# Patient Record
Sex: MALE | Race: BLACK OR AFRICAN AMERICAN | NOT HISPANIC OR LATINO | Employment: FULL TIME | URBAN - METROPOLITAN AREA
[De-identification: names, ages, dates, MRNs, and addresses within clinical notes are randomized per-mention and may not be internally consistent; named-entity substitution may affect disease eponyms.]

---

## 2021-09-18 ENCOUNTER — HOSPITAL ENCOUNTER (EMERGENCY)
Facility: HOSPITAL | Age: 41
Discharge: HOME | End: 2021-09-19
Attending: EMERGENCY MEDICINE | Admitting: EMERGENCY MEDICINE

## 2021-09-18 ENCOUNTER — APPOINTMENT (OUTPATIENT)
Dept: GENERAL RADIOLOGY | Facility: HOSPITAL | Age: 41
End: 2021-09-18

## 2021-09-18 VITALS — HEART RATE: 99 BPM | TEMPERATURE: 97.6 F | OXYGEN SATURATION: 95 % | RESPIRATION RATE: 18 BRPM

## 2021-09-18 DIAGNOSIS — S42.491A OTHER CLOSED DISPLACED FRACTURE OF DISTAL END OF RIGHT HUMERUS, INITIAL ENCOUNTER: Primary | ICD-10-CM

## 2021-09-18 PROCEDURE — 99283 EMERGENCY DEPT VISIT LOW MDM: CPT

## 2021-09-18 PROCEDURE — 73090 X-RAY EXAM OF FOREARM: CPT

## 2021-09-18 PROCEDURE — 73060 X-RAY EXAM OF HUMERUS: CPT

## 2021-09-18 RX ORDER — HYDROCODONE BITARTRATE AND ACETAMINOPHEN 5; 325 MG/1; MG/1
2 TABLET ORAL ONCE
Status: COMPLETED | OUTPATIENT
Start: 2021-09-18 | End: 2021-09-18

## 2021-09-18 RX ADMIN — HYDROCODONE BITARTRATE AND ACETAMINOPHEN 2 TABLET: 5; 325 TABLET ORAL at 22:49

## 2021-09-19 RX ORDER — HYDROCODONE BITARTRATE AND ACETAMINOPHEN 5; 325 MG/1; MG/1
1 TABLET ORAL EVERY 6 HOURS PRN
Qty: 10 TABLET | Refills: 0 | Status: SHIPPED | OUTPATIENT
Start: 2021-09-19 | End: 2021-09-21 | Stop reason: HOSPADM

## 2021-09-19 NOTE — ED PROVIDER NOTES
EMERGENCY DEPARTMENT ENCOUNTER    Room Number:  07/07  Date of encounter:  9/19/2021  PCP: Provider, No Known  Historian: Patient      HPI:  Chief Complaint: right arm injury       Context: Conor Abel is a 40 y.o. male who presents to the ED c/o right arm injury.  Patient states that he was at a arm wrestling competition at the [employer] festival when he heard a pop while arm wrestling.  Now with soft tissue swelling, pain, and decreased range of motion due to pain.      PAST MEDICAL HISTORY  Active Ambulatory Problems     Diagnosis Date Noted   • No Active Ambulatory Problems     Resolved Ambulatory Problems     Diagnosis Date Noted   • No Resolved Ambulatory Problems     No Additional Past Medical History         PAST SURGICAL HISTORY  No past surgical history on file.      FAMILY HISTORY  No family history on file.      SOCIAL HISTORY  Social History     Socioeconomic History   • Marital status: Single     Spouse name: Not on file   • Number of children: Not on file   • Years of education: Not on file   • Highest education level: Not on file         ALLERGIES  Patient has no known allergies.        REVIEW OF SYSTEMS  Review of Systems     All systems reviewed and negative except for those discussed in HPI.       PHYSICAL EXAM    I have reviewed the triage vital signs and nursing notes.    ED Triage Vitals [09/18/21 2144]   Temp Heart Rate Resp BP SpO2   97.6 °F (36.4 °C) 99 18 -- 95 %      Temp src Heart Rate Source Patient Position BP Location FiO2 (%)   Tympanic Monitor -- -- --       Physical Exam  GENERAL: Alert and oriented x3, moderate pain   HENT: nares patent  EYES: no scleral icterus, PERRL, EOMI  CV: regular rhythm, regular rate, intact radial pulse  RESPIRATORY: normal effort  MUSCULOSKELETAL: no deformity, moderate soft tissue swelling to the distal right upper arm with moderate tenderness, normal range of motion at the elbow and wrist, neurovascular intact distally  NEURO: alert, moves all  extremities, follows commands  SKIN: warm, dry, intact        LAB RESULTS  No results found for this or any previous visit (from the past 24 hour(s)).    Ordered the above labs and independently reviewed the results.        RADIOLOGY  XR Forearm 2 View Right, XR Humerus Right    Result Date: 9/18/2021  2 VIEWS RIGHT FOREARM; 2 VIEWS RIGHT HUMERUS  HISTORY: Pain  COMPARISON: None available.  FINDINGS: Right forearm: No acute fracture or subluxation of the right forearm is seen. There is no elbow effusion.  Right humerus: There is an obliquely oriented fracture of the distal humeral diaphysis. No fracture of the proximal humerus is seen. Shoulder joint appears intact. There is associated soft tissue swelling.      Obliquely oriented fracture of the distal humeral diaphysis.  This report was finalized on 9/18/2021 11:38 PM by Dr. Nemo Bruner M.D.        I ordered the above noted radiological studies. Reviewed by me and discussed with radiologist.  See dictation for official radiology interpretation.      PROCEDURES    Procedures      MEDICATIONS GIVEN IN ER    Medications   HYDROcodone-acetaminophen (NORCO) 5-325 MG per tablet 2 tablet (2 tablets Oral Given 9/18/21 2249)         PROGRESS, DATA ANALYSIS, CONSULTS, AND MEDICAL DECISION MAKING    All labs have been independently reviewed by me.  All radiology studies have been reviewed by me and discussed with radiologist dictating the report.   EKG's independently viewed and interpreted by me.  Discussion below represents my analysis of pertinent findings related to patient's condition, differential diagnosis, treatment plan and final disposition.    DDx: Includes but elevated to humerus fracture, elbow fracture, elbow dislocation, biceps tendon rupture, forearm fracture    ED Course as of Sep 19 0157   Sat Sep 18, 2021   2344 Patient care discussed with Dr. Castro, requested splint, sling and will follow up in the office.    [LINK]   Isabel Sep 19, 2021   0108 Patient  rechecked, pain improved, discussed treatment plan and need for follow-up with orthopedics with the patient.  Also discussed strict return to ER precautions including signs and symptoms of possible compartment syndrome, numbness, tingling, weakness, or any other signs or symptoms of worsening condition.  Patient expressed understanding and agrees with plan.    [LINK]   0108 Splint Application: Right arm  Splint Type: Posterior long-arm  Indication: Distal humerus fracture  Splint placed by WIL  Post splint application:   1) neurovascularly intact   2) good position  Discussed splint care with patient  Discussed PMD/orthopedic follow up      [LINK]      ED Course User Index  [LINK] Law Duenas, PA       MDM: Patient's x-rays reveal a mildly displaced distal radius fracture, have consulted with orthopedist and placed patient in a splint and sling and given follow-up information.  Patient has been instructed to wear splint apply ice and elevate and to watch for signs and symptoms of possible compartment syndrome.  Strict return to ER precautions given to the patient.    PPE: The patient wore a surgical mask throughout the entire patient encounter. I wore an N95.    AS OF 01:57 EDT VITALS:    BP -    HR - 99  TEMP - 97.6 °F (36.4 °C) (Tympanic)  O2 SATS - 95%        DIAGNOSIS  Final diagnoses:   Other closed displaced fracture of distal end of right humerus, initial encounter         DISPOSITION  DISCHARGE    Patient discharged in stable condition.    Reviewed implications of results, diagnosis, meds, responsibility to follow up, warning signs and symptoms of possible worsening, potential complications and reasons to return to ER.    Patient/Family voiced understanding of above instructions.    Discussed plan for discharge, as there is no emergent indication for admission. Patient referred to primary care provider for BP management due to today's BP. Pt/family is agreeable and understands need for follow up and repeat  testing.  Pt is aware that discharge does not mean that nothing is wrong but it indicates no emergency is present that requires admission and they must continue care with follow-up as given below or physician of their choice.     FOLLOW-UP  Myles Leblanc MD  [address] [facility] [address] [phone]    Schedule an appointment as soon as possible for a visit in 1 day           Medication List      New Prescriptions    HYDROcodone-acetaminophen 5-325 MG per tablet  Commonly known as: NORCO  Take 1 tablet by mouth Every 6 (Six) Hours As Needed for Moderate Pain  for up to 3 days.           Where to Get Your Medications      These medications were sent to [id number] - [place], [place] - [address] AT [facility] - [phone] [facility] [phone] [address] [address]    Hours: 24-hours Phone: [phone]   · HYDROcodone-acetaminophen 5-325 MG per tablet                    Law Duenas PA  09/19/21 0157

## 2021-09-19 NOTE — ED PROVIDER NOTES
Subjective   40-year-old healthy male here for chief complaint of right arm pain.  History was obtained from the patient.  The patient states that he was at a festival today arm wrestling when he heard a snap and has pain in his right humerus.  Patient denies any pain in his right elbow, forearm, wrist, hands, shoulder.  He denies any other injuries.  He states that the pain is sharp, worse with movement, improves with rest, moderate, does not radiate. Pt denies any injuries to his head.  He denies being on any blood thinners.  Patient states that he was drinking a few beers today and had 1 Deana.  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  He denies doing any drugs.          Review of Systems   All other systems reviewed and are negative.      No past medical history on file.    No Known Allergies    No past surgical history on file.    No family history on file.    Social History     Socioeconomic History   • Marital status: Single     Spouse name: Not on file   • Number of children: Not on file   • Years of education: Not on file   • Highest education level: Not on file           Objective   Physical Exam  Constitutional:       General: He is not in acute distress.     Appearance: He is not ill-appearing, toxic-appearing or diaphoretic.   HENT:      Head: Normocephalic and atraumatic.      Right Ear: External ear normal.      Left Ear: External ear normal.      Nose: Nose normal. No congestion or rhinorrhea.      Mouth/Throat:      Mouth: Mucous membranes are moist.      Pharynx: Oropharynx is clear. No oropharyngeal exudate or posterior oropharyngeal erythema.   Eyes:      General: No scleral icterus.        Right eye: No discharge.         Left eye: No discharge.      Extraocular Movements: Extraocular movements intact.      Conjunctiva/sclera: Conjunctivae normal.      Pupils: Pupils are equal, round, and reactive to light.   Neck:      Comments: No midline cervical tenderness  noted  Cardiovascular:      Rate and Rhythm: Normal rate and regular rhythm.      Pulses: Normal pulses.      Heart sounds: Normal heart sounds. No murmur heard.   No friction rub. No gallop.    Pulmonary:      Effort: Pulmonary effort is normal. No respiratory distress.      Breath sounds: Normal breath sounds. No stridor. No wheezing, rhonchi or rales.   Chest:      Chest wall: No tenderness.   Abdominal:      General: Abdomen is flat. Bowel sounds are normal. There is no distension.      Tenderness: There is no abdominal tenderness. There is no right CVA tenderness, left CVA tenderness, guarding or rebound.   Musculoskeletal:         General: Tenderness (Right humerus) present. No swelling.      Cervical back: Normal range of motion and neck supple. No rigidity or tenderness.      Right lower leg: No edema.      Left lower leg: No edema.      Comments: Right upper extremity: 2+ radial pulses, soft compartments, normal sensorimotor exam in ulnar, median, radial distribution, normal sensory exam in axillary distribution, no scaphoid tenderness, no forearm tenderness, no elbow tenderness, no tenderness in the hand, no tenderness at the shoulder    No injuries noted to any other extremities, no tenderness to palpation of the thoracic or lumbar spine.   Skin:     General: Skin is warm and dry.      Capillary Refill: Capillary refill takes less than 2 seconds.      Coloration: Skin is not jaundiced or pale.   Neurological:      Mental Status: He is alert and oriented to person, place, and time.      Sensory: No sensory deficit.      Motor: No weakness.   Psychiatric:         Mood and Affect: Mood normal.         Behavior: Behavior normal.         Procedures           ED Course  ED Course as of Sep 19 0249   Sat Sep 18, 2021   6808 Patient care discussed with Dr. Castro, requested splint, sling and will follow up in the office.    [LINK]   Isabel Sep 19, 2021   0108 Patient rechecked, pain improved, discussed treatment plan  and need for follow-up with orthopedics with the patient.  Also discussed strict return to ER precautions including signs and symptoms of possible compartment syndrome, numbness, tingling, weakness, or any other signs or symptoms of worsening condition.  Patient expressed understanding and agrees with plan.    [LINK]   0108 Splint Application: Right arm  Splint Type: Posterior long-arm  Indication: Distal humerus fracture  Splint placed by WIL  Post splint application:   1) neurovascularly intact   2) good position  Discussed splint care with patient  Discussed PMD/orthopedic follow up      [LINK]      ED Course User Index  [LINK] Law Duenas PA                                           MDM  Number of Diagnoses or Management Options     Amount and/or Complexity of Data Reviewed  Tests in the radiology section of CPT®: reviewed    Risk of Complications, Morbidity, and/or Mortality  General comments: When I first saw the patient, the patient appeared nontoxic.  Patient was complaining of right humerus pain.  Given this, x-rays were obtained.  This showed that the patient had a right humerus fracture.  Given this, we consulted orthopedic surgery.  Dr. Castro called us back.  He wanted the arm splinted.  He was comfortable with the patient going home.  He was in a follow the patient up in the outpatient clinic.  The patient was told to follow-up with him in 2 to 3 days.  Patient did get a splint placed.  Please refer to the procedure note for further details.  The patient tolerated procedure well.  Patient was neurovascular intact in the right upper extremity.  Patient's compartments are soft.  Patient was given a prescription for pain meds that was sent directly to the pharmacy.  Patient was given strict return precautions including any numbness in the arm, tingling in the arm, weakness, changes in skin color, increasing pain, or any other concerning symptoms.  All questions were answered and patient was discharged in  stable condition.  Patient expressed understanding was glad to be discharged home.    Patient Progress  Patient progress: improved      Final diagnoses:   Other closed displaced fracture of distal end of right humerus, initial encounter       ED Disposition  ED Disposition     ED Disposition Condition Comment    Discharge Stable           Myles Leblanc MD  [address] [facility] [address] [phone]    Schedule an appointment as soon as possible for a visit in 1 day           Medication List      New Prescriptions    HYDROcodone-acetaminophen 5-325 MG per tablet  Commonly known as: NORCO  Take 1 tablet by mouth Every 6 (Six) Hours As Needed for Moderate Pain  for up to 3 days.           Where to Get Your Medications      These medications were sent to [id number] - [place], [place] - [address] [facility] AT [facility] [phone] [facility] [phone] [address] [address]    Hours: 24-hours Phone: [phone]   · HYDROcodone-acetaminophen 5-325 MG per tablet          Yazan Pyle MD  09/19/21 4238

## 2021-09-19 NOTE — ED NOTES
Assisted provider with splinting of right arm. Sling applied, good pulses and circulation     Margo Andrew RN  09/19/21 0117

## 2021-09-19 NOTE — ED TRIAGE NOTES
Patient to ED with friend who states they were at the [employer] festival and arm wrestling a friend when he heard a pop.

## 2021-09-19 NOTE — DISCHARGE INSTRUCTIONS
Wear splint, apply ice and elevate, pain medicine as needed, call first thing Monday morning to schedule follow up with the orthopedist.  Return to care if you develop worsening pain, numbness, tingling, or weakness of the arm.

## 2021-09-20 ENCOUNTER — LAB (OUTPATIENT)
Dept: LAB | Facility: HOSPITAL | Age: 41
End: 2021-09-20

## 2021-09-20 ENCOUNTER — HOSPITAL ENCOUNTER (OUTPATIENT)
Dept: CARDIOLOGY | Facility: HOSPITAL | Age: 41
Discharge: HOME | End: 2021-09-20

## 2021-09-20 ENCOUNTER — TRANSCRIBE ORDERS (OUTPATIENT)
Dept: CARDIOLOGY | Facility: HOSPITAL | Age: 41
End: 2021-09-20

## 2021-09-20 ENCOUNTER — TRANSCRIBE ORDERS (OUTPATIENT)
Dept: ADMINISTRATIVE | Facility: HOSPITAL | Age: 41
End: 2021-09-20

## 2021-09-20 ENCOUNTER — ANESTHESIA EVENT (OUTPATIENT)
Dept: PERIOP | Facility: HOSPITAL | Age: 41
End: 2021-09-20

## 2021-09-20 ENCOUNTER — HOSPITAL ENCOUNTER (OUTPATIENT)
Dept: GENERAL RADIOLOGY | Facility: HOSPITAL | Age: 41
Discharge: HOME | End: 2021-09-20

## 2021-09-20 DIAGNOSIS — S42.411B RIGHT SUPRACONDYLAR HUMERUS FRACTURE, OPEN, INITIAL ENCOUNTER: Primary | ICD-10-CM

## 2021-09-20 DIAGNOSIS — S42.411B RIGHT SUPRACONDYLAR HUMERUS FRACTURE, OPEN, INITIAL ENCOUNTER: ICD-10-CM

## 2021-09-20 DIAGNOSIS — Z01.818 PREOP TESTING: ICD-10-CM

## 2021-09-20 DIAGNOSIS — Z01.811 PRE-OP CHEST EXAM: Primary | ICD-10-CM

## 2021-09-20 LAB
ALBUMIN SERPL-MCNC: 4.4 G/DL (ref 3.5–5.2)
ALBUMIN/GLOB SERPL: 1.4 G/DL
ALP SERPL-CCNC: 48 U/L (ref 39–117)
ALT SERPL W P-5'-P-CCNC: 22 U/L (ref 1–41)
ANION GAP SERPL CALCULATED.3IONS-SCNC: 12.4 MMOL/L (ref 5–15)
AST SERPL-CCNC: 28 U/L (ref 1–40)
BILIRUB SERPL-MCNC: 0.4 MG/DL (ref 0–1.2)
BUN SERPL-MCNC: 10 MG/DL (ref 6–20)
BUN/CREAT SERPL: 8.3 (ref 7–25)
CALCIUM SPEC-SCNC: 9.3 MG/DL (ref 8.6–10.5)
CHLORIDE SERPL-SCNC: 102 MMOL/L (ref 98–107)
CO2 SERPL-SCNC: 24.6 MMOL/L (ref 22–29)
CREAT SERPL-MCNC: 1.21 MG/DL (ref 0.76–1.27)
DEPRECATED RDW RBC AUTO: 45.5 FL (ref 37–54)
ERYTHROCYTE [DISTWIDTH] IN BLOOD BY AUTOMATED COUNT: 13.8 % (ref 12.3–15.4)
GFR SERPL CREATININE-BSD FRML MDRD: 80 ML/MIN/1.73
GLOBULIN UR ELPH-MCNC: 3.1 GM/DL
GLUCOSE SERPL-MCNC: 89 MG/DL (ref 65–99)
HCT VFR BLD AUTO: 47.4 % (ref 37.5–51)
HGB BLD-MCNC: 15.7 G/DL (ref 13–17.7)
MCH RBC QN AUTO: 29.8 PG (ref 26.6–33)
MCHC RBC AUTO-ENTMCNC: 33.1 G/DL (ref 31.5–35.7)
MCV RBC AUTO: 89.9 FL (ref 79–97)
PLATELET # BLD AUTO: 285 10*3/MM3 (ref 140–450)
PMV BLD AUTO: 9.8 FL (ref 6–12)
POTASSIUM SERPL-SCNC: 4 MMOL/L (ref 3.5–5.2)
PROT SERPL-MCNC: 7.5 G/DL (ref 6–8.5)
QT INTERVAL: 367 MS
RBC # BLD AUTO: 5.27 10*6/MM3 (ref 4.14–5.8)
SARS-COV-2 ORF1AB RESP QL NAA+PROBE: NOT DETECTED
SODIUM SERPL-SCNC: 139 MMOL/L (ref 136–145)
WBC # BLD AUTO: 10.8 10*3/MM3 (ref 3.4–10.8)

## 2021-09-20 PROCEDURE — 80053 COMPREHEN METABOLIC PANEL: CPT

## 2021-09-20 PROCEDURE — 93010 ELECTROCARDIOGRAM REPORT: CPT | Performed by: INTERNAL MEDICINE

## 2021-09-20 PROCEDURE — C9803 HOPD COVID-19 SPEC COLLECT: HCPCS | Performed by: ORTHOPAEDIC SURGERY

## 2021-09-20 PROCEDURE — U0004 COV-19 TEST NON-CDC HGH THRU: HCPCS | Performed by: ORTHOPAEDIC SURGERY

## 2021-09-20 PROCEDURE — 85027 COMPLETE CBC AUTOMATED: CPT

## 2021-09-20 PROCEDURE — 93005 ELECTROCARDIOGRAM TRACING: CPT

## 2021-09-20 PROCEDURE — 71046 X-RAY EXAM CHEST 2 VIEWS: CPT

## 2021-09-20 PROCEDURE — 36415 COLL VENOUS BLD VENIPUNCTURE: CPT

## 2021-09-20 RX ORDER — OMEPRAZOLE 20 MG/1
20 CAPSULE, DELAYED RELEASE ORAL DAILY PRN
COMMUNITY

## 2021-09-21 ENCOUNTER — APPOINTMENT (OUTPATIENT)
Dept: GENERAL RADIOLOGY | Facility: HOSPITAL | Age: 41
End: 2021-09-21

## 2021-09-21 ENCOUNTER — HOSPITAL ENCOUNTER (OUTPATIENT)
Facility: HOSPITAL | Age: 41
Setting detail: HOSPITAL OUTPATIENT SURGERY
Discharge: HOME | End: 2021-09-21
Attending: ORTHOPAEDIC SURGERY | Admitting: ORTHOPAEDIC SURGERY

## 2021-09-21 ENCOUNTER — ANESTHESIA (OUTPATIENT)
Dept: PERIOP | Facility: HOSPITAL | Age: 41
End: 2021-09-21

## 2021-09-21 VITALS
WEIGHT: 193.9 LBS | TEMPERATURE: 97.6 F | BODY MASS INDEX: 26.26 KG/M2 | OXYGEN SATURATION: 96 % | HEART RATE: 85 BPM | RESPIRATION RATE: 18 BRPM | DIASTOLIC BLOOD PRESSURE: 92 MMHG | SYSTOLIC BLOOD PRESSURE: 134 MMHG | HEIGHT: 72 IN

## 2021-09-21 DIAGNOSIS — S42.491A OTHER CLOSED DISPLACED FRACTURE OF DISTAL END OF RIGHT HUMERUS, INITIAL ENCOUNTER: Primary | ICD-10-CM

## 2021-09-21 PROCEDURE — 25010000002 HYDROMORPHONE PER 4 MG: Performed by: NURSE ANESTHETIST, CERTIFIED REGISTERED

## 2021-09-21 PROCEDURE — 25010000003 BUPIVACAINE LIPOSOME 1.3 % SUSPENSION: Performed by: ANESTHESIOLOGY

## 2021-09-21 PROCEDURE — 76942 ECHO GUIDE FOR BIOPSY: CPT | Performed by: ORTHOPAEDIC SURGERY

## 2021-09-21 PROCEDURE — C1713 ANCHOR/SCREW BN/BN,TIS/BN: HCPCS | Performed by: ORTHOPAEDIC SURGERY

## 2021-09-21 PROCEDURE — 25010000002 NEOSTIGMINE 5 MG/10ML SOLUTION: Performed by: NURSE ANESTHETIST, CERTIFIED REGISTERED

## 2021-09-21 PROCEDURE — C9290 INJ, BUPIVACAINE LIPOSOME: HCPCS | Performed by: ANESTHESIOLOGY

## 2021-09-21 PROCEDURE — 25010000002 MIDAZOLAM PER 1 MG: Performed by: ANESTHESIOLOGY

## 2021-09-21 PROCEDURE — 25010000002 ONDANSETRON PER 1 MG: Performed by: NURSE ANESTHETIST, CERTIFIED REGISTERED

## 2021-09-21 PROCEDURE — 25010000002 PROPOFOL 10 MG/ML EMULSION: Performed by: NURSE ANESTHETIST, CERTIFIED REGISTERED

## 2021-09-21 PROCEDURE — 73060 X-RAY EXAM OF HUMERUS: CPT

## 2021-09-21 PROCEDURE — 76000 FLUOROSCOPY <1 HR PHYS/QHP: CPT

## 2021-09-21 PROCEDURE — C1889 IMPLANT/INSERT DEVICE, NOC: HCPCS | Performed by: ORTHOPAEDIC SURGERY

## 2021-09-21 PROCEDURE — 25010000002 DEXAMETHASONE PER 1 MG: Performed by: NURSE ANESTHETIST, CERTIFIED REGISTERED

## 2021-09-21 PROCEDURE — 25010000003 CEFAZOLIN IN DEXTROSE 2-4 GM/100ML-% SOLUTION: Performed by: ORTHOPAEDIC SURGERY

## 2021-09-21 DEVICE — IMPLANTABLE DEVICE: Type: IMPLANTABLE DEVICE | Site: HUMERUS | Status: FUNCTIONAL

## 2021-09-21 DEVICE — SUT FW #2 W/TPR NDL 1/2 CIR 38IN 97CM 26.5MM BLU: Type: IMPLANTABLE DEVICE | Site: HUMERUS | Status: FUNCTIONAL

## 2021-09-21 RX ORDER — EPHEDRINE SULFATE 50 MG/ML
5 INJECTION, SOLUTION INTRAVENOUS ONCE AS NEEDED
Status: DISCONTINUED | OUTPATIENT
Start: 2021-09-21 | End: 2021-09-21 | Stop reason: HOSPADM

## 2021-09-21 RX ORDER — LIDOCAINE HYDROCHLORIDE 10 MG/ML
0.5 INJECTION, SOLUTION EPIDURAL; INFILTRATION; INTRACAUDAL; PERINEURAL ONCE AS NEEDED
Status: DISCONTINUED | OUTPATIENT
Start: 2021-09-21 | End: 2021-09-21 | Stop reason: HOSPADM

## 2021-09-21 RX ORDER — FENTANYL CITRATE 50 UG/ML
50 INJECTION, SOLUTION INTRAMUSCULAR; INTRAVENOUS
Status: DISCONTINUED | OUTPATIENT
Start: 2021-09-21 | End: 2021-09-21 | Stop reason: SDUPTHER

## 2021-09-21 RX ORDER — CEFAZOLIN SODIUM 2 G/100ML
2 INJECTION, SOLUTION INTRAVENOUS ONCE
Status: COMPLETED | OUTPATIENT
Start: 2021-09-21 | End: 2021-09-21

## 2021-09-21 RX ORDER — FLUMAZENIL 0.1 MG/ML
0.2 INJECTION INTRAVENOUS AS NEEDED
Status: DISCONTINUED | OUTPATIENT
Start: 2021-09-21 | End: 2021-09-21 | Stop reason: HOSPADM

## 2021-09-21 RX ORDER — HYDRALAZINE HYDROCHLORIDE 20 MG/ML
5 INJECTION INTRAMUSCULAR; INTRAVENOUS
Status: DISCONTINUED | OUTPATIENT
Start: 2021-09-21 | End: 2021-09-21 | Stop reason: HOSPADM

## 2021-09-21 RX ORDER — HYDROMORPHONE HCL 110MG/55ML
PATIENT CONTROLLED ANALGESIA SYRINGE INTRAVENOUS AS NEEDED
Status: DISCONTINUED | OUTPATIENT
Start: 2021-09-21 | End: 2021-09-21 | Stop reason: SURG

## 2021-09-21 RX ORDER — ROCURONIUM BROMIDE 10 MG/ML
INJECTION, SOLUTION INTRAVENOUS AS NEEDED
Status: DISCONTINUED | OUTPATIENT
Start: 2021-09-21 | End: 2021-09-21 | Stop reason: SURG

## 2021-09-21 RX ORDER — ONDANSETRON 2 MG/ML
INJECTION INTRAMUSCULAR; INTRAVENOUS AS NEEDED
Status: DISCONTINUED | OUTPATIENT
Start: 2021-09-21 | End: 2021-09-21 | Stop reason: SURG

## 2021-09-21 RX ORDER — SODIUM CHLORIDE 0.9 % (FLUSH) 0.9 %
3 SYRINGE (ML) INJECTION EVERY 12 HOURS SCHEDULED
Status: DISCONTINUED | OUTPATIENT
Start: 2021-09-21 | End: 2021-09-21 | Stop reason: HOSPADM

## 2021-09-21 RX ORDER — SODIUM CHLORIDE, SODIUM LACTATE, POTASSIUM CHLORIDE, CALCIUM CHLORIDE 600; 310; 30; 20 MG/100ML; MG/100ML; MG/100ML; MG/100ML
9 INJECTION, SOLUTION INTRAVENOUS CONTINUOUS
Status: DISCONTINUED | OUTPATIENT
Start: 2021-09-21 | End: 2021-09-21 | Stop reason: HOSPADM

## 2021-09-21 RX ORDER — DEXAMETHASONE SODIUM PHOSPHATE 10 MG/ML
INJECTION INTRAMUSCULAR; INTRAVENOUS AS NEEDED
Status: DISCONTINUED | OUTPATIENT
Start: 2021-09-21 | End: 2021-09-21 | Stop reason: SURG

## 2021-09-21 RX ORDER — PROMETHAZINE HYDROCHLORIDE 25 MG/1
25 TABLET ORAL ONCE AS NEEDED
Status: DISCONTINUED | OUTPATIENT
Start: 2021-09-21 | End: 2021-09-21 | Stop reason: HOSPADM

## 2021-09-21 RX ORDER — IBUPROFEN 600 MG/1
600 TABLET ORAL EVERY 6 HOURS PRN
Qty: 40 TABLET | Refills: 0 | Status: SHIPPED | OUTPATIENT
Start: 2021-09-21

## 2021-09-21 RX ORDER — HYDROCODONE BITARTRATE AND ACETAMINOPHEN 7.5; 325 MG/1; MG/1
1 TABLET ORAL ONCE AS NEEDED
Status: DISCONTINUED | OUTPATIENT
Start: 2021-09-21 | End: 2021-09-21 | Stop reason: HOSPADM

## 2021-09-21 RX ORDER — FAMOTIDINE 10 MG/ML
20 INJECTION, SOLUTION INTRAVENOUS ONCE
Status: DISCONTINUED | OUTPATIENT
Start: 2021-09-21 | End: 2021-09-21 | Stop reason: HOSPADM

## 2021-09-21 RX ORDER — LIDOCAINE HYDROCHLORIDE 20 MG/ML
INJECTION, SOLUTION INFILTRATION; PERINEURAL AS NEEDED
Status: DISCONTINUED | OUTPATIENT
Start: 2021-09-21 | End: 2021-09-21 | Stop reason: SURG

## 2021-09-21 RX ORDER — NEOSTIGMINE METHYLSULFATE 0.5 MG/ML
INJECTION, SOLUTION INTRAVENOUS AS NEEDED
Status: DISCONTINUED | OUTPATIENT
Start: 2021-09-21 | End: 2021-09-21 | Stop reason: SURG

## 2021-09-21 RX ORDER — FAMOTIDINE 10 MG/ML
20 INJECTION, SOLUTION INTRAVENOUS ONCE
Status: COMPLETED | OUTPATIENT
Start: 2021-09-21 | End: 2021-09-21

## 2021-09-21 RX ORDER — ONDANSETRON 2 MG/ML
4 INJECTION INTRAMUSCULAR; INTRAVENOUS ONCE AS NEEDED
Status: DISCONTINUED | OUTPATIENT
Start: 2021-09-21 | End: 2021-09-21 | Stop reason: HOSPADM

## 2021-09-21 RX ORDER — LABETALOL HYDROCHLORIDE 5 MG/ML
5 INJECTION, SOLUTION INTRAVENOUS
Status: DISCONTINUED | OUTPATIENT
Start: 2021-09-21 | End: 2021-09-21 | Stop reason: HOSPADM

## 2021-09-21 RX ORDER — SODIUM CHLORIDE 0.9 % (FLUSH) 0.9 %
3-10 SYRINGE (ML) INJECTION AS NEEDED
Status: DISCONTINUED | OUTPATIENT
Start: 2021-09-21 | End: 2021-09-21 | Stop reason: HOSPADM

## 2021-09-21 RX ORDER — DIPHENHYDRAMINE HYDROCHLORIDE 50 MG/ML
12.5 INJECTION INTRAMUSCULAR; INTRAVENOUS
Status: DISCONTINUED | OUTPATIENT
Start: 2021-09-21 | End: 2021-09-21 | Stop reason: HOSPADM

## 2021-09-21 RX ORDER — MIDAZOLAM HYDROCHLORIDE 1 MG/ML
1 INJECTION INTRAMUSCULAR; INTRAVENOUS
Status: DISCONTINUED | OUTPATIENT
Start: 2021-09-21 | End: 2021-09-21 | Stop reason: SDUPTHER

## 2021-09-21 RX ORDER — FENTANYL CITRATE 50 UG/ML
50 INJECTION, SOLUTION INTRAMUSCULAR; INTRAVENOUS
Status: DISCONTINUED | OUTPATIENT
Start: 2021-09-21 | End: 2021-09-21 | Stop reason: HOSPADM

## 2021-09-21 RX ORDER — NALOXONE HCL 0.4 MG/ML
0.2 VIAL (ML) INJECTION AS NEEDED
Status: DISCONTINUED | OUTPATIENT
Start: 2021-09-21 | End: 2021-09-21 | Stop reason: HOSPADM

## 2021-09-21 RX ORDER — MIDAZOLAM HYDROCHLORIDE 1 MG/ML
1 INJECTION INTRAMUSCULAR; INTRAVENOUS
Status: DISCONTINUED | OUTPATIENT
Start: 2021-09-21 | End: 2021-09-21 | Stop reason: HOSPADM

## 2021-09-21 RX ORDER — OXYCODONE AND ACETAMINOPHEN 7.5; 325 MG/1; MG/1
1-2 TABLET ORAL EVERY 6 HOURS PRN
Qty: 40 TABLET | Refills: 0 | Status: SHIPPED | OUTPATIENT
Start: 2021-09-21

## 2021-09-21 RX ORDER — PROMETHAZINE HYDROCHLORIDE 25 MG/1
25 SUPPOSITORY RECTAL ONCE AS NEEDED
Status: DISCONTINUED | OUTPATIENT
Start: 2021-09-21 | End: 2021-09-21 | Stop reason: HOSPADM

## 2021-09-21 RX ORDER — LIDOCAINE HYDROCHLORIDE 40 MG/ML
SOLUTION TOPICAL AS NEEDED
Status: DISCONTINUED | OUTPATIENT
Start: 2021-09-21 | End: 2021-09-21 | Stop reason: SURG

## 2021-09-21 RX ORDER — MAGNESIUM HYDROXIDE 1200 MG/15ML
LIQUID ORAL AS NEEDED
Status: DISCONTINUED | OUTPATIENT
Start: 2021-09-21 | End: 2021-09-21 | Stop reason: HOSPADM

## 2021-09-21 RX ORDER — IBUPROFEN 600 MG/1
600 TABLET ORAL ONCE AS NEEDED
Status: DISCONTINUED | OUTPATIENT
Start: 2021-09-21 | End: 2021-09-21 | Stop reason: HOSPADM

## 2021-09-21 RX ORDER — HYDROMORPHONE HYDROCHLORIDE 1 MG/ML
0.5 INJECTION, SOLUTION INTRAMUSCULAR; INTRAVENOUS; SUBCUTANEOUS
Status: DISCONTINUED | OUTPATIENT
Start: 2021-09-21 | End: 2021-09-21 | Stop reason: HOSPADM

## 2021-09-21 RX ORDER — GLYCOPYRROLATE 0.2 MG/ML
INJECTION INTRAMUSCULAR; INTRAVENOUS AS NEEDED
Status: DISCONTINUED | OUTPATIENT
Start: 2021-09-21 | End: 2021-09-21 | Stop reason: SURG

## 2021-09-21 RX ORDER — DIPHENHYDRAMINE HCL 25 MG
25 CAPSULE ORAL
Status: DISCONTINUED | OUTPATIENT
Start: 2021-09-21 | End: 2021-09-21 | Stop reason: HOSPADM

## 2021-09-21 RX ORDER — BUPIVACAINE HYDROCHLORIDE 2.5 MG/ML
INJECTION, SOLUTION EPIDURAL; INFILTRATION; INTRACAUDAL
Status: COMPLETED | OUTPATIENT
Start: 2021-09-21 | End: 2021-09-21

## 2021-09-21 RX ORDER — OXYCODONE AND ACETAMINOPHEN 10; 325 MG/1; MG/1
1 TABLET ORAL EVERY 4 HOURS PRN
Status: DISCONTINUED | OUTPATIENT
Start: 2021-09-21 | End: 2021-09-21 | Stop reason: HOSPADM

## 2021-09-21 RX ORDER — PROPOFOL 10 MG/ML
VIAL (ML) INTRAVENOUS AS NEEDED
Status: DISCONTINUED | OUTPATIENT
Start: 2021-09-21 | End: 2021-09-21 | Stop reason: SURG

## 2021-09-21 RX ORDER — MIDAZOLAM HYDROCHLORIDE 1 MG/ML
INJECTION INTRAMUSCULAR; INTRAVENOUS
Status: COMPLETED | OUTPATIENT
Start: 2021-09-21 | End: 2021-09-21

## 2021-09-21 RX ADMIN — LIDOCAINE HYDROCHLORIDE 1 EACH: 40 SOLUTION TOPICAL at 11:06

## 2021-09-21 RX ADMIN — ROCURONIUM BROMIDE 50 MG: 50 INJECTION INTRAVENOUS at 11:05

## 2021-09-21 RX ADMIN — HYDROMORPHONE HYDROCHLORIDE 0.5 MG: 2 INJECTION, SOLUTION INTRAMUSCULAR; INTRAVENOUS; SUBCUTANEOUS at 13:27

## 2021-09-21 RX ADMIN — ROCURONIUM BROMIDE 20 MG: 50 INJECTION INTRAVENOUS at 11:59

## 2021-09-21 RX ADMIN — SODIUM CHLORIDE, POTASSIUM CHLORIDE, SODIUM LACTATE AND CALCIUM CHLORIDE: 600; 310; 30; 20 INJECTION, SOLUTION INTRAVENOUS at 10:37

## 2021-09-21 RX ADMIN — PROPOFOL 220 MG: 10 INJECTION, EMULSION INTRAVENOUS at 11:05

## 2021-09-21 RX ADMIN — GLYCOPYRROLATE 0.4 MG: 0.2 INJECTION INTRAMUSCULAR; INTRAVENOUS at 14:07

## 2021-09-21 RX ADMIN — FAMOTIDINE 20 MG: 10 INJECTION INTRAVENOUS at 08:35

## 2021-09-21 RX ADMIN — BUPIVACAINE HYDROCHLORIDE 30 ML: 2.5 INJECTION, SOLUTION EPIDURAL; INFILTRATION; INTRACAUDAL; PERINEURAL at 09:06

## 2021-09-21 RX ADMIN — NEOSTIGMINE METHYLSULFATE 2 MG: 0.5 INJECTION INTRAVENOUS at 14:07

## 2021-09-21 RX ADMIN — LIDOCAINE HYDROCHLORIDE 60 MG: 20 INJECTION, SOLUTION INFILTRATION; PERINEURAL at 11:05

## 2021-09-21 RX ADMIN — CEFAZOLIN SODIUM 2 G: 2 INJECTION, SOLUTION INTRAVENOUS at 10:51

## 2021-09-21 RX ADMIN — BUPIVACAINE 10 ML: 13.3 INJECTION, SUSPENSION, LIPOSOMAL INFILTRATION at 09:06

## 2021-09-21 RX ADMIN — ONDANSETRON 4 MG: 2 INJECTION INTRAMUSCULAR; INTRAVENOUS at 13:59

## 2021-09-21 RX ADMIN — MIDAZOLAM 2 MG: 1 INJECTION INTRAMUSCULAR; INTRAVENOUS at 09:09

## 2021-09-21 RX ADMIN — DEXAMETHASONE SODIUM PHOSPHATE 6 MG: 10 INJECTION INTRAMUSCULAR; INTRAVENOUS at 11:30

## 2021-09-21 RX ADMIN — MIDAZOLAM 2 MG: 1 INJECTION INTRAMUSCULAR; INTRAVENOUS at 09:07

## 2021-09-21 NOTE — ANESTHESIA PROCEDURE NOTES
Airway  Urgency: elective    Date/Time: 9/21/2021 11:06 AM  Airway not difficult    General Information and Staff    Patient location during procedure: OR  Anesthesiologist: Conor Mcguire MD  CRNA: Zion Jara CRNA    Indications and Patient Condition  Indications for airway management: airway protection    Preoxygenated: yes  MILS maintained throughout  Mask difficulty assessment: 1 - vent by mask    Final Airway Details  Final airway type: endotracheal airway      Successful airway: ETT  Cuffed: yes   Successful intubation technique: direct laryngoscopy  Endotracheal tube insertion site: oral  Blade: Minnie  Blade size: 4  ETT size (mm): 7.5  Cormack-Lehane Classification: grade I - full view of glottis  Placement verified by: chest auscultation and capnometry   Measured from: teeth  ETT/EBT  to teeth (cm): 22  Number of attempts at approach: 1  Assessment: lips, teeth, and gum same as pre-op and atraumatic intubation

## 2021-09-21 NOTE — OP NOTE
HUMERUS DISTAL OPEN REDUCTION INTERNAL FIXATION  Procedure Note    Conor Abel  9/21/2021    Pre-op Diagnosis: Right distal third humerus fracture   Post-op Diagnosis: Same  Procedure: ORIF right distal third humerus fracture, 43847  Surgeon:  Myles Leblanc MD  Assistant: RAO Banuelos  Anesthesia: General with Block, Anesthesiologist: Conor Mcguire MD  CRNA: Zion Jara CRNA  Staff: Circulator: Kasie Lozada RN; Hernandez Boyd RN  Scrub Person: Jonelle Ohara CFA  Estimated Blood Loss: 100ml  Specimens: * No orders in the log *  Drains: none  Complications: None    Components Utilized:    Implant Name Type Inv. Item Serial No.  Lot No. LRB No. Used Action   SUT FW #2 W/TPR NDL 1/2 CIR 38IN 97CM 26.5MM HAM - [id number] Implant SUT FW #2 W/TPR NDL 1/2 CIR 38IN 97CM 26.5MM HAM  [employer]  Right 1 Implanted   SUT FW #2 W/TPR NDL 1/2 CIR 38IN 97CM 26.5MM HAM - [id number] Implant SUT FW #2 W/TPR NDL 1/2 CIR 38IN 97CM 26.5MM HAM  [employer]  Right 1 Implanted   PLT FIB ALPS COMPOSIT 6H 163MM - [id number] Implant PLT FIB ALPS COMPOSIT 6H 163MM  [employer] . Right 1 Implanted   SCRW BENNETT 3.5X24MM - [id number] Implant SCRW BENNETT 3.5X24MM  [employer] . Right 1 Implanted   SCRW BENNETT 3.5X26MM - [id number] Implant SCRW BENNETT 3.5X26MM  [employer] . Right 3 Implanted   SCRW BENNETT 3.5X28MM - [id number] Implant SCRW BENNETT 3.5X28MM  [employer] . Right 1 Implanted   SCRW BENNETT 3.5X30MM - [id number] Implant SCRW BENNETT 3.5X30MM  [employer] . Right 1 Implanted   SCRW BENNETT 3.5X36MM - [id number] Implant SCRW BENNETT 3.5X36MM  [employer] . Right 1 Implanted   SCRW BENNETT 3.5X32MM - [id number] Implant SCRW BENNETT 3.5X32MM  [employer] . Right 1 Implanted   SCRW BENNETT LK 3.5X26MM - [id number] Implant SCRW BENNETT LK 3.5X26MM  [employer] . Right 1 Implanted   SCRW BENNETT LK 3.5X24MM - [id number] Implant SCRW BENNETT LK 3.5X24MM  [employer] . Right 2 Implanted       Indication for Procedure:  This  patient is a 40 y.o. male who was arm wrestling when he felt his right arm pop.  He was noted to have a displaced distal third humerus fracture.  It was felt he would benefit from ORIF of the fracture.  Surgical options and non-surgical options were discussed in detail and to the patient's satisfaction.  Surgical intervention was recommended based on the patient's injury and functional status.      The risks and benefits of surgery were discussed with patient and informed consent was obtained.  Risks include but are not limited to, infection, bleeding, nerve injury, blood clots, risks associated with anesthesia, need for further surgery, persistent pain, and possibly death.    Protocols for intravenous antibiotics and venous thrombosis were followed for this patient.  IV antibiotics were infused prior to surgery and will be discontinued within 24 hours of completion of the surgical procedure.       DESCRIPTION OF PROCEDURE:     The patient was seen in Preoperative Holding Area where his right arm surgical site was marked. Preoperative antibiotics were received. H&P and consent updated.  He was taken to the Operating Room and provided general anesthesia on the Operating Room table.  He was carefully positioned in the left lateral decubitus position.  Right upper extremity was prepped and draped in typical sterile fashion.  A timeout was performed confirming the correct surgical site and procedure.  A longitudinal posterior incision was made from the mid arm extending down to the elbow and slightly radial.  Soft tissues were elevated over the triceps.  The triceps was then mobilized in an ulnar direction.  Access to the distal humerus was achieved.  The radial nerve was identified and protected throughout the case.  Fracture site was identified.  It was debrided irrigated and clamped.  Lag screw was placed from ulnar to radial oblique across the fracture.  The distal elbow plates were noted to be too short based on  the fracture pattern.  A straight plate was then chosen and taken as far distal as possible.  This was right above the olecranon fossa.  A bicortical screw was placed to secure the plate to the humerus distally.  In similar fashion this was performed proximally.  A series of locking screws were placed distal.  Proximally, the screws were placed in the shaft above the fracture.  The incision was taken proximally and the muscle was carefully split to percutaneously secure the plate.  Screw was initially placed and noted to be slightly ulnar.  Therefore, I redirected the screw.  Upon placing the most proximal screw, the head of the screw broke off.  This was removed.  However, the remaining portion of the screw was within the humerus, positioned appropriately.  It did have interference fit with the plate.  At this point, final AP and lateral images were taken confirming appropriate reduction of the fracture and placement of hardware.  Wound was copiously irrigated with normal saline containing Betadine.  Radial nerve again was inspected and noted to be intact without any excessive tension.  Full motion of the elbow noted.  Fracture remained stable.  The triceps was repaired along the radial side where it had been elevated.  Subtenons tissues were closed with 2-0 Vicryl suture.  Skin was closed with staples.  Xeroform, 4 x 4's, ABD pads, cast padding, and a well-padded posterior splint was placed.  The patient was subsequently awakened from general anesthesia in stable condition.  He was taken to the PACU postoperatively.    Postoperative Plan:  The patient will be discharged home.  He will follow-up in the office in 10 to 14 days.  He will be nonweightbearing on the right upper extremity.  When he returns to the office.  We will place him in a T scope brace.  He will begin gentle range of motion for the elbow.  He will be nonweightbearing for 4 to 6 weeks.      RAO Banuelos assisted for the entirety of the  surgical procedure.  He was necessary for retraction, reduction of the fracture, implant placement, and closure of the wound.      No complications were encountered during the surgical procedure.      Myles Leblanc MD

## 2021-09-21 NOTE — ANESTHESIA POSTPROCEDURE EVALUATION
Patient: Conor Abel    Procedure Summary     Date: 09/21/21 Room / Location: [facility] OR  / [facility] MAIN OR    Anesthesia Start: 1053 Anesthesia Stop: 1437    Procedure: OPEN REDUCTION INTERNAL FIXATION  OF RIGHT DISTAL  HUMERUS FRACTURE (Right Arm Upper) Diagnosis:     Surgeons: Myles Leblanc MD Provider: Conor Mcguire MD    Anesthesia Type: general, regional ASA Status: 3          Anesthesia Type: general, regional    Vitals  Vitals Value Taken Time   /91 09/21/21 1516   Temp 36.4 °C (97.6 °F) 09/21/21 1525   Pulse 81 09/21/21 1528   Resp 16 09/21/21 1515   SpO2 98 % 09/21/21 1528   Vitals shown include unvalidated device data.        Post Anesthesia Care and Evaluation    Patient location during evaluation: PACU  Patient participation: complete - patient participated  Level of consciousness: awake and alert  Pain management: adequate  Airway patency: patent  Anesthetic complications: No anesthetic complications    Cardiovascular status: acceptable  Respiratory status: acceptable  Hydration status: acceptable    Comments: --------------------            09/21/21               1532     --------------------   BP:       134/86     Pulse:      92       Resp:       18       Temp:                SpO2:      95%      --------------------

## 2021-09-21 NOTE — ANESTHESIA PREPROCEDURE EVALUATION
Anesthesia Evaluation     Patient summary reviewed and Nursing notes reviewed   NPO Solid Status: > 8 hours  NPO Liquid Status: > 2 hours           Airway   Mallampati: II  TM distance: >3 FB  Neck ROM: full  Dental - normal exam     Pulmonary - negative pulmonary ROS and normal exam    breath sounds clear to auscultation  Cardiovascular - negative cardio ROS and normal exam    ECG reviewed  Rhythm: regular  Rate: normal    (-) angina, orthopnea, PND, ACOSTA    ROS comment: NORMAL ECG -  Sinus rhythm  NO PRIOR TRACING AVAILABLE FOR COMPARISON    Neuro/Psych  (+) headaches (History of migraine),     GI/Hepatic/Renal/Endo    (+)  GERD,      Musculoskeletal     (+) neck pain,   Abdominal    Substance History - negative use     OB/GYN negative ob/gyn ROS         Other - negative ROS                     Anesthesia Plan    ASA 3     general with block   (ISB block with exparel PSR for POPC     I have reviewed the patient's history with the patient and the chart, including all pertinent laboratory results and imaging. I have explained the risks of anesthesia including but not limited to dental damage, corneal abrasion, nerve injury, MI, stroke, and death. Questions asked and answered. Anesthetic plan discussed with patient and team as indicated. Patient expressed understanding of the above.  )  intravenous induction     Anesthetic plan, all risks, benefits, and alternatives have been provided, discussed and informed consent has been obtained with: patient.

## 2022-08-17 ENCOUNTER — HOSPITAL ENCOUNTER (EMERGENCY)
Facility: HOSPITAL | Age: 42
Discharge: HOME | End: 2022-08-17
Attending: EMERGENCY MEDICINE | Admitting: EMERGENCY MEDICINE

## 2022-08-17 VITALS
HEIGHT: 72 IN | TEMPERATURE: 97.8 F | BODY MASS INDEX: 25.6 KG/M2 | RESPIRATION RATE: 16 BRPM | WEIGHT: 189 LBS | SYSTOLIC BLOOD PRESSURE: 130 MMHG | OXYGEN SATURATION: 99 % | DIASTOLIC BLOOD PRESSURE: 88 MMHG | HEART RATE: 74 BPM

## 2022-08-17 DIAGNOSIS — R51.9 ACUTE NONINTRACTABLE HEADACHE, UNSPECIFIED HEADACHE TYPE: Primary | ICD-10-CM

## 2022-08-17 PROCEDURE — 25010000002 PROCHLORPERAZINE 10 MG/2ML SOLUTION: Performed by: PHYSICIAN ASSISTANT

## 2022-08-17 PROCEDURE — 25010000002 KETOROLAC TROMETHAMINE PER 15 MG: Performed by: PHYSICIAN ASSISTANT

## 2022-08-17 PROCEDURE — 99283 EMERGENCY DEPT VISIT LOW MDM: CPT

## 2022-08-17 PROCEDURE — 96375 TX/PRO/DX INJ NEW DRUG ADDON: CPT

## 2022-08-17 PROCEDURE — 96374 THER/PROPH/DIAG INJ IV PUSH: CPT

## 2022-08-17 PROCEDURE — 25010000002 DIPHENHYDRAMINE PER 50 MG: Performed by: PHYSICIAN ASSISTANT

## 2022-08-17 RX ORDER — PROCHLORPERAZINE EDISYLATE 5 MG/ML
10 INJECTION INTRAMUSCULAR; INTRAVENOUS ONCE
Status: COMPLETED | OUTPATIENT
Start: 2022-08-17 | End: 2022-08-17

## 2022-08-17 RX ORDER — PROCHLORPERAZINE MALEATE 10 MG
10 TABLET ORAL EVERY 8 HOURS PRN
Qty: 12 TABLET | Refills: 0 | Status: SHIPPED | OUTPATIENT
Start: 2022-08-17

## 2022-08-17 RX ORDER — HYDROCODONE BITARTRATE AND ACETAMINOPHEN 7.5; 325 MG/1; MG/1
1 TABLET ORAL 3 TIMES DAILY PRN
COMMUNITY

## 2022-08-17 RX ORDER — KETOROLAC TROMETHAMINE 30 MG/ML
30 INJECTION, SOLUTION INTRAMUSCULAR; INTRAVENOUS ONCE
Status: COMPLETED | OUTPATIENT
Start: 2022-08-17 | End: 2022-08-17

## 2022-08-17 RX ORDER — DIPHENHYDRAMINE HYDROCHLORIDE 50 MG/ML
25 INJECTION INTRAMUSCULAR; INTRAVENOUS ONCE
Status: COMPLETED | OUTPATIENT
Start: 2022-08-17 | End: 2022-08-17

## 2022-08-17 RX ORDER — PROCHLORPERAZINE MALEATE 10 MG
10 TABLET ORAL EVERY 8 HOURS PRN
Qty: 12 TABLET | Refills: 0 | Status: SHIPPED | OUTPATIENT
Start: 2022-08-17 | End: 2022-08-17 | Stop reason: SDUPTHER

## 2022-08-17 RX ADMIN — PROCHLORPERAZINE EDISYLATE 10 MG: 5 INJECTION INTRAMUSCULAR; INTRAVENOUS at 22:43

## 2022-08-17 RX ADMIN — DIPHENHYDRAMINE HYDROCHLORIDE 25 MG: 50 INJECTION, SOLUTION INTRAMUSCULAR; INTRAVENOUS at 22:40

## 2022-08-17 RX ADMIN — SODIUM CHLORIDE, POTASSIUM CHLORIDE, SODIUM LACTATE AND CALCIUM CHLORIDE 1000 ML: 600; 310; 30; 20 INJECTION, SOLUTION INTRAVENOUS at 22:43

## 2022-08-17 RX ADMIN — KETOROLAC TROMETHAMINE 30 MG: 30 INJECTION, SOLUTION INTRAMUSCULAR at 22:41

## 2022-08-18 NOTE — ED PROVIDER NOTES
EMERGENCY DEPARTMENT ENCOUNTER    Room Number:  07/07  Date of encounter:  8/19/2022  PCP: Provider, No Known  Historian: Patient      HPI:  Chief Complaint: Headache  A complete HPI/ROS/PMH/PSH/SH/FH are unobtainable due to: Nothing    Context: Conor Abel is a very pleasant 41 y.o. male who presents to the ED c/o headache.  Patient states he has a past medical history of migraines.  He has been evaluated by specialist in the past and had normal imaging.  He states he used to get them so frequently he was placed on amitriptyline.  The headaches gradually decreased in frequency to the point that the patient was actually able to stop his amitriptyline approximately a year ago.  He states it has been sometime since he last had a migraine headache but this headache feels exactly like previous migraines.  He describes it as sitting on the left side of the hemicranium, 8/10 on the pain scale, throbbing, associated with photophobia but no vision loss.  He denies neck pain, fever, states the headache came on gradually and was not abrupt in onset.  He denies recent head injury or head trauma.  He denies IVD use, remote history of CVA.      Reviewing the patient's chart he was last seen in this emergency department in September 2021 where he fractured the distal end of his right humerus and arm wrestling match.      PAST MEDICAL HISTORY  Active Ambulatory Problems     Diagnosis Date Noted   • No Active Ambulatory Problems     Resolved Ambulatory Problems     Diagnosis Date Noted   • No Resolved Ambulatory Problems     Past Medical History:   Diagnosis Date   • Chronic neck and back pain    • GERD (gastroesophageal reflux disease)    • History of migraine    • Humerus fracture    • Pain management          PAST SURGICAL HISTORY  Past Surgical History:   Procedure Laterality Date   • APPENDECTOMY     • ORIF HUMERUS FRACTURE Right 9/21/2021    Procedure: OPEN REDUCTION INTERNAL FIXATION  OF RIGHT DISTAL  HUMERUS  FRACTURE;  Surgeon: Myles Leblanc MD;  Location: [facility] MAIN OR;  Service: Orthopedics;  Laterality: Right;   • TESTICLE TORSION REPAIR     • WISDOM TOOTH EXTRACTION           FAMILY HISTORY  Family History   Problem Relation Age of Onset   • Malig Hyperthermia Neg Hx          SOCIAL HISTORY  Social History     Socioeconomic History   • Marital status: Single   Tobacco Use   • Smoking status: Never Smoker   • Smokeless tobacco: Never Used   Vaping Use   • Vaping Use: Never used   Substance and Sexual Activity   • Alcohol use: Yes     Comment: OCCASIONALLY    • Drug use: Never         ALLERGIES  Patient has no known allergies.        REVIEW OF SYSTEMS  Review of Systems   Constitutional: Negative for chills and fever.   Respiratory: Negative for cough and shortness of breath.    Cardiovascular: Negative for chest pain and palpitations.   Gastrointestinal: Negative for abdominal pain.   Musculoskeletal: Negative for neck pain.   Skin: Negative.    Neurological: Positive for headaches. Negative for dizziness, weakness and numbness.   Hematological: Negative.    Psychiatric/Behavioral: Negative.         All systems reviewed and negative except for those discussed in HPI.       PHYSICAL EXAM    I have reviewed the triage vital signs and nursing notes.    ED Triage Vitals [08/17/22 2154]   Temp Heart Rate Resp BP SpO2   97.8 °F (36.6 °C) 102 18 141/92 97 %      Temp src Heart Rate Source Patient Position BP Location FiO2 (%)   Tympanic -- -- -- --       Physical Exam  GENERAL: Very pleasant, nontoxic, no acute distress  HENT: nares patent, face symmetric, mucous brains moist  EYES: no scleral icterus, pupils equal bilaterally  CV: regular rhythm, regular rate  RESPIRATORY: normal effort  ABDOMEN: soft  MUSCULOSKELETAL: no deformity  NEURO: alert, moves all extremities, follows commands, no focal neurodeficits  SKIN: warm, dry        LAB RESULTS  No results found for this or any previous visit (from the past 24  hour(s)).    Ordered the above labs and independently reviewed the results.        RADIOLOGY  No Radiology Exams Resulted Within Past 24 Hours    I ordered the above noted radiological studies. Reviewed by me and discussed with radiologist.  See dictation for official radiology interpretation.      PROCEDURES    Procedures      MEDICATIONS GIVEN IN ER    Medications   lactated ringers bolus 1,000 mL (0 mL Intravenous Stopped 8/17/22 2342)   ketorolac (TORADOL) injection 30 mg (30 mg Intravenous Given 8/17/22 2241)   diphenhydrAMINE (BENADRYL) injection 25 mg (25 mg Intravenous Given 8/17/22 2240)   prochlorperazine (COMPAZINE) injection 10 mg (10 mg Intravenous Given 8/17/22 2243)         PROGRESS, DATA ANALYSIS, CONSULTS, AND MEDICAL DECISION MAKING    All labs have been independently reviewed by me.  All radiology studies have been reviewed by me and discussed with radiologist dictating the report.   EKG's independently viewed and interpreted by me.  Discussion below represents my analysis of pertinent findings related to patient's condition, differential diagnosis, treatment plan and final disposition.    Working diagnosis is migraine headache.  Discussed work-up versus treating the headache.  Patient declines work-up.  I agree with this choice of care.  We will treat with a migraine cocktail consisting of Toradol, Benadryl, Compazine..    ED Course as of 08/19/22 0627   Wed Aug 17, 2022   2202 BP: 141/92 [RC]   2202 Temp: 97.8 °F (36.6 °C) [RC]   2202 Heart Rate: 102 [RC]   2202 Resp: 18 [RC]   2202 SpO2: 97 %  RA [RC]   2301 Reassessed patient just received his medication approximately 15 minutes ago.  He states he already is feeling dramatically better.  Will allow the IVF to run and reassess.  Anticipate discharge home at this time. [RC]      ED Course User Index  [RC] Delio Oakley III, PA           PPE: The patient wore a surgical mask throughout the entire patient encounter. I wore an N95.    AS  OF 06:27 EDT VITALS:    BP - 130/88  HR - 74  TEMP - 97.8 °F (36.6 °C) (Tympanic)  O2 SATS - 99%        DIAGNOSIS  Final diagnoses:   Acute nonintractable headache, unspecified headache type         DISPOSITION  DISCHARGE    Patient discharged in stable condition.    Reviewed implications of results, diagnosis, meds, responsibility to follow up, warning signs and symptoms of possible worsening, potential complications and reasons to return to ER.    Patient/Family voiced understanding of above instructions.    Discussed plan for discharge, as there is no emergent indication for admission. Patient referred to primary care provider for BP management due to today's BP. Pt/family is agreeable and understands need for follow up and repeat testing.  Pt is aware that discharge does not mean that nothing is wrong but it indicates no emergency is present that requires admission and they must continue care with follow-up as given below or physician of their choice.     FOLLOW-UP  Your family physician      As needed         Medication List      New Prescriptions    prochlorperazine 10 MG tablet  Commonly known as: COMPAZINE  Take 1 tablet by mouth Every 8 (Eight) Hours As Needed for Nausea or Vomiting (Headache).           Where to Get Your Medications      These medications were sent to [facility]/pharmacy #[id number] - [place], [place] - [address]  - [phone]  - [phone]  [address], [facility] [address]    Hours: 24-hours Phone: [phone]   · prochlorperazine 10 MG tablet                Delio Oakley III, PA  08/19/22 9156

## 2022-08-18 NOTE — DISCHARGE INSTRUCTIONS
Recommend resting at home in a dark room for the next 24 hours to ensure complete resolve the headache.  Should the headache rebound you may try to take 10 mg of the Compazine prescribed, 600 mg ibuprofen, and 25-50 mg of Benadryl.  Again rest in a dark room until complete resolve.  May try this twice.  If the headache does not abort recommend return the emergency department for repeat management.

## 2022-08-18 NOTE — ED NOTES
Pt arrives ambulatory to triage via pv for migraines for the past week. Pt reports the pain is mainly on his left side of his head. Pt has a hx of migraines. Pt reports mild blurry vision. Pt tried taking Tylenol migraine formula with no relief. Pt has not been able to get his amitriptyline medication for awhile now.

## 2022-08-19 PROCEDURE — 99283 EMERGENCY DEPT VISIT LOW MDM: CPT

## 2022-08-20 ENCOUNTER — HOSPITAL ENCOUNTER (EMERGENCY)
Facility: HOSPITAL | Age: 42
Discharge: HOME | End: 2022-08-20
Attending: EMERGENCY MEDICINE | Admitting: EMERGENCY MEDICINE

## 2022-08-20 VITALS
DIASTOLIC BLOOD PRESSURE: 94 MMHG | RESPIRATION RATE: 18 BRPM | OXYGEN SATURATION: 96 % | TEMPERATURE: 97.5 F | SYSTOLIC BLOOD PRESSURE: 142 MMHG | HEART RATE: 70 BPM | HEIGHT: 72 IN | BODY MASS INDEX: 25.63 KG/M2

## 2022-08-20 DIAGNOSIS — G43.809 OTHER MIGRAINE WITHOUT STATUS MIGRAINOSUS, NOT INTRACTABLE: Primary | ICD-10-CM

## 2022-08-20 PROCEDURE — 25010000002 DIPHENHYDRAMINE PER 50 MG: Performed by: EMERGENCY MEDICINE

## 2022-08-20 PROCEDURE — 96375 TX/PRO/DX INJ NEW DRUG ADDON: CPT

## 2022-08-20 PROCEDURE — 25010000002 PROCHLORPERAZINE 10 MG/2ML SOLUTION: Performed by: EMERGENCY MEDICINE

## 2022-08-20 PROCEDURE — 96374 THER/PROPH/DIAG INJ IV PUSH: CPT

## 2022-08-20 RX ORDER — SODIUM CHLORIDE 0.9 % (FLUSH) 0.9 %
10 SYRINGE (ML) INJECTION AS NEEDED
Status: DISCONTINUED | OUTPATIENT
Start: 2022-08-20 | End: 2022-08-20 | Stop reason: HOSPADM

## 2022-08-20 RX ORDER — DIPHENHYDRAMINE HYDROCHLORIDE 50 MG/ML
25 INJECTION INTRAMUSCULAR; INTRAVENOUS ONCE
Status: COMPLETED | OUTPATIENT
Start: 2022-08-20 | End: 2022-08-20

## 2022-08-20 RX ORDER — PROCHLORPERAZINE EDISYLATE 5 MG/ML
5 INJECTION INTRAMUSCULAR; INTRAVENOUS ONCE
Status: COMPLETED | OUTPATIENT
Start: 2022-08-20 | End: 2022-08-20

## 2022-08-20 RX ORDER — SODIUM CHLORIDE 9 MG/ML
125 INJECTION, SOLUTION INTRAVENOUS CONTINUOUS
Status: DISCONTINUED | OUTPATIENT
Start: 2022-08-20 | End: 2022-08-20 | Stop reason: HOSPADM

## 2022-08-20 RX ADMIN — PROCHLORPERAZINE EDISYLATE 5 MG: 5 INJECTION INTRAMUSCULAR; INTRAVENOUS at 02:04

## 2022-08-20 RX ADMIN — SODIUM CHLORIDE 1000 ML: 9 INJECTION, SOLUTION INTRAVENOUS at 02:04

## 2022-08-20 RX ADMIN — DIPHENHYDRAMINE HYDROCHLORIDE 25 MG: 50 INJECTION, SOLUTION INTRAMUSCULAR; INTRAVENOUS at 02:04

## 2022-08-20 NOTE — ED NOTES
Pt ambulatory to triage from home with c/o migraine - states was seen here Wednesday for same. Left sided migraine came back yesterday, but worse today. Pt has photophobia.  Pt wearing mask in triage. Triage personnel wore appropriate PPE

## 2022-08-20 NOTE — ED PROVIDER NOTES
EMERGENCY DEPARTMENT ENCOUNTER    CHIEF COMPLAINT  Chief Complaint: Headache  History given by: Patient  History limited by: Nothing  Room Number: 17/17  PMD: Provider, No Known  Headache Dr. Bunn: Dr. Pyle    HPI:  Pt is a 41 y.o. male presents complaining of intermittent headaches for 20 years, worse over the past 1 to 2 weeks with frequent headaches.  Patient's reports his pain is in left frontal/parietal area is constant pressure-like, not assessed changed by position, activity.  Patient denies visual disturbance, speech disturbance, unilateral weakness or numbness.  Patient reports nausea but denies vomiting, fever, chest pain, shortness of air, cough, abdominal pain, changes in urinary or bowel habits.    Duration: 1 to 2 weeks  Associated Symptoms: Nausea  Aggravating Factors: Nothing  Alleviating Factors: Nothing  Treatment before arrival: Prescriptions taking at home    PAST MEDICAL HISTORY  Active Ambulatory Problems     Diagnosis Date Noted   • No Active Ambulatory Problems     Resolved Ambulatory Problems     Diagnosis Date Noted   • No Resolved Ambulatory Problems     Past Medical History:   Diagnosis Date   • Chronic neck and back pain    • GERD (gastroesophageal reflux disease)    • History of migraine    • Humerus fracture    • Pain management        PAST SURGICAL HISTORY  Past Surgical History:   Procedure Laterality Date   • APPENDECTOMY     • ORIF HUMERUS FRACTURE Right 9/21/2021    Procedure: OPEN REDUCTION INTERNAL FIXATION  OF RIGHT DISTAL  HUMERUS FRACTURE;  Surgeon: Myles Leblanc MD;  Location: [facility];  Service: Orthopedics;  Laterality: Right;   • TESTICLE TORSION REPAIR     • WISDOM TOOTH EXTRACTION         FAMILY HISTORY  Family History   Problem Relation Age of Onset   • Malig Hyperthermia Neg Hx        SOCIAL HISTORY  Social History     Socioeconomic History   • Marital status: Single   Tobacco Use   • Smoking status: Never Smoker   • Smokeless tobacco: Never Used   Vaping Use    • Vaping Use: Never used   Substance and Sexual Activity   • Alcohol use: Yes     Comment: OCCASIONALLY    • Drug use: Never       ALLERGIES  Patient has no known allergies.    REVIEW OF SYSTEMS  Review of Systems   Constitutional: Positive for activity change and fatigue. Negative for chills and fever.   HENT: Negative for sore throat and trouble swallowing.    Eyes: Negative for visual disturbance.   Respiratory: Negative for cough and shortness of breath.    Cardiovascular: Negative for chest pain and leg swelling.   Gastrointestinal: Positive for nausea. Negative for abdominal pain, diarrhea and vomiting.   Endocrine: Negative.    Genitourinary: Negative for decreased urine volume and frequency.   Musculoskeletal: Negative for neck pain.   Skin: Negative for rash.   Allergic/Immunologic: Negative.    Neurological: Positive for headaches. Negative for weakness and numbness.   Hematological: Negative.    Psychiatric/Behavioral: Negative.    All other systems reviewed and are negative.      PHYSICAL EXAM  ED Triage Vitals [08/19/22 2139]   Temp Heart Rate Resp BP SpO2   97.5 °F (36.4 °C) 79 16 144/91 96 %      Temp src Heart Rate Source Patient Position BP Location FiO2 (%)   Tympanic Monitor Standing Right arm --       Physical Exam  Vitals and nursing note reviewed.   Constitutional:       General: He is in acute distress.   HENT:      Head: Normocephalic and atraumatic.   Neck:      Meningeal: Brudzinski's sign and Kernig's sign absent.   Cardiovascular:      Rate and Rhythm: Normal rate and regular rhythm.      Pulses:           Posterior tibial pulses are 2+ on the right side and 2+ on the left side.      Heart sounds: Normal heart sounds. No murmur heard.  Pulmonary:      Effort: Pulmonary effort is normal. No respiratory distress.      Breath sounds: Normal breath sounds. No wheezing.   Abdominal:      General: Bowel sounds are normal.      Palpations: Abdomen is soft.      Tenderness: There is no  abdominal tenderness. There is no guarding or rebound.   Musculoskeletal:         General: Normal range of motion.      Cervical back: Normal range of motion and neck supple. No rigidity. No pain with movement.   Skin:     General: Skin is warm and dry.   Neurological:      Mental Status: He is alert and oriented to person, place, and time.   Psychiatric:         Mood and Affect: Affect normal.         LAB RESULTS  Lab Results (last 24 hours)     ** No results found for the last 24 hours. **          I ordered the above labs and reviewed the results    RADIOLOGY  No orders to display        I ordered the above noted radiological studies. Interpreted by radiologist. Viewed by me in PACS.       PROCEDURES  Procedures      PROGRESS AND CONSULTS  ED Course as of 08/20/22 0531   Sat Aug 20, 2022   0145 QT interval normal  EKG September '21 [TO]   0145 RN reports patient's headache much improved post medications in the ER. [TO]      ED Course User Index  [TO] Catia Miguel MD           MEDICAL DECISION MAKING  Results were reviewed/discussed with the patient and they were also made aware of online access. Pt also made aware that some labs, such as cultures, will not be resulted during ER visit and followup with PMD is necessary.       MDM       DIAGNOSIS  Final diagnoses:   Other migraine without status migrainosus, not intractable       DISPOSITION  DISCHARGE    Patient discharged in stable condition.    Reviewed implications of results, diagnosis, meds, responsibility to follow up, warning signs and symptoms of possible worsening, potential complications and reasons to return to ER    Patient/Family voiced understanding of above instructions.    Discussed plan for discharge, as there is no emergent indication for admission. Patient referred to primary care provider for BP management due to today's BP. Pt/family is agreeable and understands need for follow up and repeat testing.  Pt is aware that discharge does not mean  that nothing is wrong but it indicates no emergency is present that requires admission and they must continue care with follow-up as given below or physician of their choice.     FOLLOW-UP  [facility] REFERRAL SERVICE  [facility] [address] [phone]  Schedule an appointment as soon as possible for a visit in 3 days  EVEN IF WELL         Medication List      No changes were made to your prescriptions during this visit.           Latest Documented Vital Signs:  As of 05:31 EDT  BP- 142/94 HR- 70 Temp- 97.5 °F (36.4 °C) (Tympanic) O2 sat- 96%    --  Patient was wearing facemask when I entered the room and throughout our encounter. Full protective equipment was worn throughout this patient encounter including a face mask, eye protection and gloves. Hand hygiene was performed before donning protective equipment and after removal when leaving the room.      Catia Miguel MD  08/20/22 0556

## 2022-08-20 NOTE — DISCHARGE INSTRUCTIONS
You are advised to follow closely with primary care provider of your choice in 2-3 days for recheck,  and further testing/treatment as needed.    Hydrate well-drink at least 64 ounces of fluids daily.  If you are a caffeine drinker do not quit now.  Medications as previously directed    If you have persistent headaches please consider following with neurology specialist for further testing, treatment, medication adjustment as needed.      Please return to the emergency department immediately with chest pain different than usual for you, shortness of air, abdominal pain, persistent vomiting/fever, blood in emesis or stool, lightheadedness/fainting, problems with speech, one sided weakness/numbness, new incontinence, problems with vision, altered mental status, worst headache of your life, or for worsening of symptoms or other concerns.

## 2025-04-14 NOTE — ANESTHESIA PROCEDURE NOTES
Peripheral Block    Pre-sedation assessment completed: 9/21/2021 9:06 AM    Patient reassessed immediately prior to procedure    Patient location during procedure: holding area  Start time: 9/21/2021 9:06 AM  Stop time: 9/21/2021 9:19 AM  Reason for block: at surgeon's request and post-op pain management  Performed by  Anesthesiologist: Conor Mcguire MD  Preanesthetic Checklist  Completed: patient identified, IV checked, site marked, risks and benefits discussed, surgical consent, monitors and equipment checked, pre-op evaluation and timeout performed  Prep:  Pt Position: sitting  Sterile barriers:cap, gloves, gown, mask and sterile barriers  Prep: ChloraPrep  Patient monitoring: blood pressure monitoring, continuous pulse oximetry and EKG  Procedure  Sedation:yes  Performed under: local infiltration  Guidance:ultrasound guided  ULTRASOUND INTERPRETATION.  Using ultrasound guidance a 21 G gauge needle was placed in close proximity to the brachial plexus nerve, at which point, under ultrasound guidance anesthetic was injected in the area of the nerve and spread of the anesthesia was seen on ultrasound in close proximity thereto.  There were no abnormalities seen on ultrasound; a digital image was taken; and the patient tolerated the procedure with no complications. Images:still images obtained    Laterality:right  Block Type:interscalene  Injection Technique:single-shot  Needle Type:echogenic  Needle Gauge:21 G      Medications Used: bupivacaine liposome (EXPAREL) 1.3 % injection, 10 mL  bupivacaine PF (MARCAINE) 0.25 % injection, 30 mL      Medications  Comment:Ultrasound Interpretation: Using ultrasound guidance, the needle was placed in close proximity to the target nerve and anesthetic was injected in the area of the target nerve and/or bundles, and spread of the anesthetic was seen on ultrasound in close proximity thereto.  There were no abnormalities seen on ultrasound; a digital / physical image was  taken; and the patient tolerated the procedure with no complications.   Block placed for postoperative pain control per surgeon request.    Post Assessment  Injection Assessment: negative aspiration for heme, no paresthesia on injection and incremental injection  Patient Tolerance:comfortable throughout block  Complications:no             no complications

## (undated) DEVICE — 3M™ IOBAN™ 2 ANTIMICROBIAL INCISE DRAPE 6650EZ: Brand: IOBAN™ 2

## (undated) DEVICE — MEDI-VAC YANKAUER SUCTION HANDLE W/BULBOUS TIP: Brand: CARDINAL HEALTH

## (undated) DEVICE — PK SHLDR OPN 40

## (undated) DEVICE — ANTIBACTERIAL UNDYED BRAIDED (POLYGLACTIN 910), SYNTHETIC ABSORBABLE SUTURE: Brand: COATED VICRYL

## (undated) DEVICE — SUT MNCRYL PLS ANTIB UD 4/0 PS2 18IN

## (undated) DEVICE — DRP C/ARM 41X74IN

## (undated) DEVICE — 3M™ STERI-STRIP™ REINFORCED ADHESIVE SKIN CLOSURES, R1547, 1/2 IN X 4 IN (12 MM X 100 MM), 6 STRIPS/ENVELOPE: Brand: 3M™ STERI-STRIP™

## (undated) DEVICE — DRSNG GZ PETROLTM XEROFORM CURAD 1X8IN STRL

## (undated) DEVICE — GLV SURG SIGNATURE ESSENTIAL PF LTX SZ8.5

## (undated) DEVICE — GLV SURG BIOGEL LTX PF 8

## (undated) DEVICE — VESSEL LOOPS X-RAY DETECTABLE: Brand: DEROYAL

## (undated) DEVICE — MAT FLR ABSORBENT LG 4FT 10 2.5FT

## (undated) DEVICE — APPL CHLORAPREP HI/LITE 26ML ORNG

## (undated) DEVICE — TRAP FLD MINIVAC MEGADYNE 100ML

## (undated) DEVICE — DRAPE,U/ SHT,SPLIT,PLAS,STERIL: Brand: MEDLINE

## (undated) DEVICE — PENCL E/S ULTRAVAC TELESCP NOSE HOLSTR 10FT

## (undated) DEVICE — SPNG GZ WOVN 4X4IN 12PLY 10/BX STRL

## (undated) DEVICE — GLV SURG PREMIERPRO ORTHO LTX PF SZ8 BRN